# Patient Record
Sex: MALE | Race: OTHER
[De-identification: names, ages, dates, MRNs, and addresses within clinical notes are randomized per-mention and may not be internally consistent; named-entity substitution may affect disease eponyms.]

---

## 2018-06-25 PROBLEM — Z00.00 ENCOUNTER FOR PREVENTIVE HEALTH EXAMINATION: Status: ACTIVE | Noted: 2018-06-25

## 2018-06-28 ENCOUNTER — APPOINTMENT (OUTPATIENT)
Dept: ORTHOPEDIC SURGERY | Facility: CLINIC | Age: 23
End: 2018-06-28

## 2019-02-15 ENCOUNTER — APPOINTMENT (OUTPATIENT)
Dept: ORTHOPEDIC SURGERY | Facility: CLINIC | Age: 24
End: 2019-02-15
Payer: MEDICAID

## 2019-02-15 VITALS
BODY MASS INDEX: 21.7 KG/M2 | DIASTOLIC BLOOD PRESSURE: 61 MMHG | WEIGHT: 155 LBS | HEART RATE: 50 BPM | SYSTOLIC BLOOD PRESSURE: 107 MMHG | HEIGHT: 71 IN

## 2019-02-15 DIAGNOSIS — Z78.9 OTHER SPECIFIED HEALTH STATUS: ICD-10-CM

## 2019-02-15 DIAGNOSIS — Z72.3 LACK OF PHYSICAL EXERCISE: ICD-10-CM

## 2019-02-15 DIAGNOSIS — F17.200 NICOTINE DEPENDENCE, UNSPECIFIED, UNCOMPLICATED: ICD-10-CM

## 2019-02-15 PROCEDURE — 99204 OFFICE O/P NEW MOD 45 MIN: CPT

## 2019-02-15 RX ORDER — MELOXICAM 7.5 MG/1
7.5 TABLET ORAL
Qty: 30 | Refills: 0 | Status: ACTIVE | COMMUNITY
Start: 2019-02-15 | End: 1900-01-01

## 2019-02-19 ENCOUNTER — RX RENEWAL (OUTPATIENT)
Age: 24
End: 2019-02-19

## 2019-02-21 NOTE — HISTORY OF PRESENT ILLNESS
[de-identified] : Patient is here for right foot pain that began on 2/14/19 when he was doing yard work. He was taken to [facility] where xrays were taken that were positive for fracture. he was splinted, given crutches, a prescription for Oxycodone and told to follow up outpatient. He states that he is still in a lot of pain despite the medication and that it mostly just makes him drowsy.

## 2019-02-21 NOTE — PHYSICAL EXAM
[de-identified] : Constitutional: Well-nourished, well-developed, No acute distress\par Respiratory:  Good respiratory effort, no SOB\par Lymphatic: No regional lymphadenopathy, no lymphedema\par Psychiatric: Pleasant and normal affect, alert and oriented x3\par Skin: Clean dry and intact B/L UE/LE\par Musculoskeletal: normal except where as noted in regional exam\par \par \par Left Foot:\par APPEARANCE: no marked deformities, no swelling or malalignment\par POSITIVE TENDERNESS: none\par PULSES: 2+ DP/PT pulses\par NONTENDER: 5th metatarsal base, cuboid, 1st MTP, dorsum & plantar surfaces, medial heel, mid heel. \par ROM: normal throughout foot, ankle, and digits. \par RESISTIVE TESTING: painless flex/ext, abd/add of all digits. \par SPECIAL TESTS:  neg Tinel's at tarsal tunnel. \par B/L Knees: No asymmetry, malalignment, or swelling, Full ROM, 5/5 strength in Flex/Ext, Joints stable\par B/L Ankles: No asymmetry, malalignment, or swelling, Full ROM, 5/5 strength in DF/PF/Inv/Ev, Joints stable\par \par \par Right Foot:\par APPEARANCE: + swelling over dorsum of foot and toes, no marked deformities or malalignment\par POSITIVE TENDERNESS: + TTP navicular bone\par NONTENDER: 5th metatarsal base, cuboid, 1st MTP, dorsum & plantar surfaces, medial heel, mid heel. \par ROM: Limited toe flexion/extension due to stiffness/pain, otherwise normal throughout foot, ankle, and digits. \par RESISTIVE TESTING: + mild pain with flex/ext, abd/add of all digits. \par SPECIAL TESTS:  neg Tinel's at tarsal tunnel. \par NEURO: Normal sensation of LE, DTRs 2+/4 patella and achilles\par PULSES: 2+ DP/PT pulses\par  [de-identified] : I reviewed and clinically correlated the following outside imaging studies, \par urgent care xrays, 3 views of the right foot, evidence of a small avulsion chip fx of the dorsal navicular bone

## 2019-02-21 NOTE — DISCUSSION/SUMMARY
[de-identified] : Discussed findings of today's exam and possible causes of patient's pain.  Educated patient on their diagnosis of right foot dorsal avulsion chip fracture of the navicular bone.  Reviewed possible courses of treatment, and we collaboratively decided best course of treatment at this time will include conservative management. Patient will be immobilized in a walking boot, he may transition off of his crutches as tolerated, he may be full weightbearing as tolerated. 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  Patient started on a course of oral NSAIDs, prescription given for Mobic.  A prescription of percocet has been prescribed, I informed the patient that this is a narcotic pain medication and that it causes sedation and it should not be taken while operating machinery, or while caring for children/family members alone.  I also advised the patient that all narcotic pain medications have addictive potential and therefore should be taken as little as possible, and for the shortest duration needed.  Patient appreciates and agrees with current plan.\par \par This note was generated using dragon medical dictation software.  A reasonable effort has been made for proofreading its contents, but typos may still remain.  If there are any questions or points of clarification needed please notify my office.\par \par Using the website [email or web address], the [place] prescription monitoring program registry was searched for this patient. The confidential drug utilization report was reviewed prior to the controlled substance prescription being given to the patient. Reference #:  [id number]\par

## 2019-03-04 ENCOUNTER — APPOINTMENT (OUTPATIENT)
Dept: ORTHOPEDIC SURGERY | Facility: CLINIC | Age: 24
End: 2019-03-04
Payer: MEDICAID

## 2019-03-04 DIAGNOSIS — S92.254D NONDISPLACED FRACTURE OF NAVICULAR [SCAPHOID] OF RIGHT FOOT, SUBSEQUENT ENCOUNTER FOR FRACTURE WITH ROUTINE HEALING: ICD-10-CM

## 2019-03-04 DIAGNOSIS — S92.254A NONDISPLACED FRACTURE OF NAVICULAR [SCAPHOID] OF RIGHT FOOT, INITIAL ENCOUNTER FOR CLOSED FRACTURE: ICD-10-CM

## 2019-03-04 PROCEDURE — 99213 OFFICE O/P EST LOW 20 MIN: CPT

## 2019-03-04 PROCEDURE — 73630 X-RAY EXAM OF FOOT: CPT | Mod: RT

## 2019-03-04 RX ORDER — OXYCODONE AND ACETAMINOPHEN 5; 325 MG/1; MG/1
5-325 TABLET ORAL
Qty: 28 | Refills: 0 | Status: DISCONTINUED | COMMUNITY
Start: 2019-02-15 | End: 2019-03-04

## 2019-03-04 NOTE — DISCUSSION/SUMMARY
[de-identified] : Patient was seen today for followup of right foot/ankle pain. He has routine healing of his small avulsion chip fracture of the navicular bone. Primarily has pain at this time is located on the ankle joint, primarily overlying the deltoid ligament. I advised the patient it is more that he has pain from his ankle sprain, and that he has routine healing of his fracture. At this time recommend continuation of Mobic, no need for further narcotic pain medications.  Patient will be started on a course of physical therapy to restore normal range of motion and strength as tolerated. Patient works in [employer], recommend reevaluation in 2-3 weeks to see if he can return to work at that time.  Patient appreciates and agrees with current plan.\par \par This note was generated using dragon medical dictation software.  A reasonable effort has been made for proofreading its contents, but typos may still remain.  If there are any questions or points of clarification needed please notify my office.

## 2019-03-04 NOTE — HISTORY OF PRESENT ILLNESS
[de-identified] : Patient is here today following up after a closed nondisplaced fracture of navicular bone of right foot, As instructed, the patient has been compliant with immobilization in walking boot.  Patient states that he has been having continued pain in his foot especially at night. He has been taking the medications with moderate relief. He states that he has been trying to do toe curls and ankle pumps but states that he has had pain with that. He denies new injury.

## 2019-03-04 NOTE — RETURN TO WORK/SCHOOL
[FreeTextEntry1] : Nilson was re-evaluated today for his right foot fracture and ankle sprain.  He is being started on a course of physical therapy and will be re-evaluated in 2-3 weeks to determine if he can return to work at that time.\par Thank you for your understanding.\par \par Sincerely,\par \par Gab Tamez DO, ATC\par Primary Care Sports Medicine\par [facility]\par

## 2019-03-04 NOTE — PHYSICAL EXAM
[de-identified] : Constitutional: Well-nourished, well-developed, No acute distress\par Respiratory:  Good respiratory effort, no SOB\par Lymphatic: No regional lymphadenopathy, no lymphedema\par Psychiatric: Pleasant and normal affect, alert and oriented x3\par Skin: Clean dry and intact B/L UE/LE\par Musculoskeletal: normal except where as noted in regional exam\par \par Right Ankle/Foot:\par APPEARANCE: + minimal swelling over dorsum of foot, no marked deformities or malalignment\par POSITIVE TENDERNESS: + mild TTP navicular bone, + TTP of deltoid ligament and medial ankle\par NONTENDER: 5th metatarsal base, cuboid, 1st MTP, dorsum & plantar surfaces, medial heel, mid heel. \par ROM: Limited DF/PF/Inv/Eversion and toe flexion/extension due to stiffness/pain, otherwise normal throughout foot, ankle, and digits. \par RESISTIVE TESTING: + 4/5 ankle DF/PF/Inv/Ev, mild pain with flex/ext, abd/add of all digits. \par SPECIAL TESTS:  neg Tinel's at tarsal tunnel. \par NEURO: Normal sensation of LE, DTRs 2+/4 patella and achilles\par PULSES: 2+ DP/PT pulses\par  [de-identified] : The following radiographs were ordered and read by me during this patient's visit. I reviewed each radiograph in detail with the patient and discussed the findings as highlighted below. \par \par 3 views of the right foot were obtained today that show a small avulsion chip fx of the dorsal navicular bone, with routine healing.  There is no malalignment. There is no joint dislocation, or degenerative changes seen. No other obvious osseous abnormality. Otherwise unremarkable.\par

## 2019-03-25 ENCOUNTER — APPOINTMENT (OUTPATIENT)
Dept: ORTHOPEDIC SURGERY | Facility: CLINIC | Age: 24
End: 2019-03-25

## 2025-05-08 ENCOUNTER — NON-APPOINTMENT (OUTPATIENT)
Age: 30
End: 2025-05-08

## 2025-07-02 ENCOUNTER — NON-APPOINTMENT (OUTPATIENT)
Age: 30
End: 2025-07-02